# Patient Record
Sex: FEMALE | Race: WHITE | NOT HISPANIC OR LATINO | Employment: FULL TIME | URBAN - METROPOLITAN AREA
[De-identification: names, ages, dates, MRNs, and addresses within clinical notes are randomized per-mention and may not be internally consistent; named-entity substitution may affect disease eponyms.]

---

## 2020-11-01 PROCEDURE — U0003 INFECTIOUS AGENT DETECTION BY NUCLEIC ACID (DNA OR RNA); SEVERE ACUTE RESPIRATORY SYNDROME CORONAVIRUS 2 (SARS-COV-2) (CORONAVIRUS DISEASE [COVID-19]), AMPLIFIED PROBE TECHNIQUE, MAKING USE OF HIGH THROUGHPUT TECHNOLOGIES AS DESCRIBED BY CMS-2020-01-R: HCPCS | Performed by: NURSE PRACTITIONER

## 2025-02-03 ENCOUNTER — TELEPHONE (OUTPATIENT)
Dept: CARDIOLOGY | Facility: CLINIC | Age: 35
End: 2025-02-03
Payer: COMMERCIAL

## 2025-02-03 NOTE — TELEPHONE ENCOUNTER
Patient has new patient appointment 2/3/2025 - called referring provider and requested LOV.   Stated they will send over today once provider signs off on note.

## 2025-02-04 ENCOUNTER — OFFICE VISIT (OUTPATIENT)
Dept: CARDIOLOGY | Facility: CLINIC | Age: 35
End: 2025-02-04
Payer: COMMERCIAL

## 2025-02-04 VITALS
HEIGHT: 66 IN | DIASTOLIC BLOOD PRESSURE: 82 MMHG | OXYGEN SATURATION: 99 % | WEIGHT: 187 LBS | SYSTOLIC BLOOD PRESSURE: 131 MMHG | HEART RATE: 84 BPM | BODY MASS INDEX: 30.05 KG/M2

## 2025-02-04 DIAGNOSIS — R00.2 PALPITATIONS: Primary | ICD-10-CM

## 2025-02-04 DIAGNOSIS — E03.9 HYPOTHYROIDISM (ACQUIRED): ICD-10-CM

## 2025-02-04 PROCEDURE — 99204 OFFICE O/P NEW MOD 45 MIN: CPT | Performed by: INTERNAL MEDICINE

## 2025-02-04 PROCEDURE — 93000 ELECTROCARDIOGRAM COMPLETE: CPT | Performed by: INTERNAL MEDICINE

## 2025-02-04 RX ORDER — ETONOGESTREL AND ETHINYL ESTRADIOL .12; .015 MG/D; MG/D
RING VAGINAL
COMMUNITY

## 2025-02-04 RX ORDER — BENZOYL PEROXIDE 50 MG/G
CREAM TOPICAL
COMMUNITY

## 2025-02-04 RX ORDER — DEXTROAMPHETAMINE SACCHARATE, AMPHETAMINE ASPARTATE MONOHYDRATE, DEXTROAMPHETAMINE SULFATE AND AMPHETAMINE SULFATE 2.5; 2.5; 2.5; 2.5 MG/1; MG/1; MG/1; MG/1
CAPSULE, EXTENDED RELEASE ORAL
COMMUNITY

## 2025-02-04 RX ORDER — DEXTROAMPHETAMINE SACCHARATE, AMPHETAMINE ASPARTATE MONOHYDRATE, DEXTROAMPHETAMINE SULFATE AND AMPHETAMINE SULFATE 7.5; 7.5; 7.5; 7.5 MG/1; MG/1; MG/1; MG/1
CAPSULE, EXTENDED RELEASE ORAL
COMMUNITY

## 2025-02-04 RX ORDER — TAZAROTENE 0.45 MG/G
LOTION TOPICAL
COMMUNITY

## 2025-02-04 RX ORDER — SPIRONOLACTONE 100 MG/1
1 TABLET, FILM COATED ORAL DAILY
COMMUNITY
Start: 2024-10-24

## 2025-02-04 NOTE — PROGRESS NOTES
Encounter Date:02/04/2025      Patient ID: Belkis Lozano is a 35 y.o. female.    Chief Complaint:  Palpitations  ADD  Hypothyroidism    History of Present Illness  The patient is a pleasant 35-year-old white female local primary care office he is here for evaluation of rapid heart rate with jogging.  Patient has been jogging for a while and recently probably for the last 1 year patient has been having significantly increased heart rate sometimes makes her vomit.  Patient has history of ADD but has not taken Adderall for some time.  She has cut down on caffeinated drinks.    The patient has been without any chest discomfort ,shortness of breath,, dizziness or syncope.  Denies having any headache ,abdominal pain ,nausea, vomiting , diarrhea constipation, loss of weight or loss of appetite.  Denies having any excessive bruising ,hematuria or blood in the stool.    Review of all systems negative except as indicated.    Reviewed ROS.    Assessment and Plan     The patient is a pleasant 35-year-old white female local primary care office he is here for evaluation of rapid heart rate with jogging.  Patient has been jogging for a while and recently probably for the last 1 year patient has been having significantly increased heart rate sometimes makes her vomit.  Patient has history of ADD but has not taken Adderall for some time.  She has cut down on caffeinated drinks.    ]]]]]]]]]]]]]]]]  Impression  =========  - Rapid heart rate with jogging    -ADD hypothyroidism  Rarely takes Adderall.    - Denies having any surgical problems.    - Family history is negative for coronary artery disease    - Non-smoker    - No known allergies  ==========  Plan  ===========  Patient is having palpitations which are not new.  Patient has history of ADD but has not been taking Adderall.  Patient has hypothyroidism.  Patient is on supplements of 75 mcg of Synthroid.  Patient had last test was in May 2024.    EKG showed sinus rhythm without  ischemic changes.  2/4/2025.    Patient to have lab work including TSH (CMP CBC magnesium level TSH)    Echocardiogram.    Medications were reviewed and updated.    Follow-up in the office on the same day or soon after.    Further plan will depend on patient's progress.    ]]]]]]]]]]]]]]]]]]               Diagnosis Plan   1. Palpitations  Adult Transthoracic Echo Complete W/ Cont if Necessary Per Protocol    ECG 12 Lead      2. Hypothyroidism (acquired)  ECG 12 Lead      LAB RESULTS (LAST 7 DAYS)    CBC        BMP        CMP         BNP        TROPONIN        CoAg        Creatinine Clearance  CrCl cannot be calculated (Patient's most recent lab result is older than the maximum 30 days allowed.).    ABG        Radiology  No radiology results for the last day                The following portions of the patient's history were reviewed and updated as appropriate: allergies, current medications, past family history, past medical history, past social history, past surgical history, and problem list.    Review of Systems   Constitutional: Negative for malaise/fatigue.   Cardiovascular:  Negative for chest pain, dyspnea on exertion, leg swelling and palpitations.   Respiratory:  Negative for cough and shortness of breath.    Gastrointestinal:  Positive for nausea and vomiting. Negative for abdominal pain.   Neurological:  Negative for dizziness, focal weakness, headaches, light-headedness and numbness.   All other systems reviewed and are negative.        Current Outpatient Medications:     amphetamine-dextroamphetamine XR (ADDERALL XR) 10 MG 24 hr capsule, TAKE 1 CAPSULE BY MOUTH EVERY MORNING WITH 30 MG CAPSULE., Disp: , Rfl:     amphetamine-dextroamphetamine XR (ADDERALL XR) 30 MG 24 hr capsule, TAKE 1 CAPSULE BY MOUTH EVERY MORNING WITH 10 MG CAPSULE, Disp: , Rfl:     benzoyl peroxide (Epsolay) 5 % cream, , Disp: , Rfl:     EluRyng 0.12-0.015 MG/24HR vaginal ring, Please see attached for detailed directions, Disp: ,  "Rfl:     levothyroxine (SYNTHROID, LEVOTHROID) 75 MCG tablet, Take 1 tablet by mouth Daily., Disp: , Rfl:     SEMAGLUTIDE, 1 MG/DOSE, SC, , Disp: , Rfl:     spironolactone (ALDACTONE) 100 MG tablet, Take 1 tablet by mouth Daily., Disp: , Rfl:     Tazarotene (Arazlo) 0.045 % lotion, , Disp: , Rfl:     No Known Allergies    History reviewed. No pertinent family history.    History reviewed. No pertinent surgical history.    Past Medical History:   Diagnosis Date    Hypothyroid        History reviewed. No pertinent family history.    Social History     Socioeconomic History    Marital status:    Tobacco Use    Smoking status: Never     Passive exposure: Never    Smokeless tobacco: Never   Vaping Use    Vaping status: Never Used   Substance and Sexual Activity    Alcohol use: Yes     Alcohol/week: 4.0 standard drinks of alcohol     Types: 2 Glasses of wine, 2 Cans of beer per week    Drug use: Never    Sexual activity: Yes     Partners: Male     Birth control/protection: Vaginal contraceptive ring           ECG 12 Lead    Date/Time: 2/4/2025 2:24 PM  Performed by: Dinah Schwartz MD    Authorized by: Dinah Schwartz MD  Comparison: compared with previous ECG   Similar to previous ECG  Comparison to previous ECG: Normal sinus rhythm sinus arrhythmia right axis deviation 77/min no ectopy no prior tracing for comparison.          Objective:       Physical Exam    /82 (BP Location: Right arm, Patient Position: Sitting, Cuff Size: Adult)   Pulse 84   Ht 167.6 cm (66\")   Wt 84.8 kg (187 lb)   SpO2 99%   Breastfeeding No   BMI 30.18 kg/m²   The patient is alert, oriented and in no distress.    Vital signs as noted above.    Head and neck revealed no carotid bruits or jugular venous distension.  No thyromegaly or lymphadenopathy is present.    Lungs clear.  No wheezing.  Breath sounds are normal bilaterally.    Heart normal first and second heart sounds.  No murmur..  No pericardial rub is present.  No " gallop is present.    Abdomen soft and nontender.  No organomegaly is present.    Extremities revealed good peripheral pulses without any pedal edema.    Skin warm and dry.    Musculoskeletal system is grossly normal.    CNS grossly normal.    Reviewed and updated.

## 2025-02-04 NOTE — TELEPHONE ENCOUNTER
Called referring provider regarding LOV note again - had to leave VM to send OV note since we have not received it.

## 2025-02-05 ENCOUNTER — PATIENT ROUNDING (BHMG ONLY) (OUTPATIENT)
Dept: CARDIOLOGY | Facility: CLINIC | Age: 35
End: 2025-02-05
Payer: COMMERCIAL